# Patient Record
Sex: FEMALE | Race: WHITE | HISPANIC OR LATINO | URBAN - METROPOLITAN AREA
[De-identification: names, ages, dates, MRNs, and addresses within clinical notes are randomized per-mention and may not be internally consistent; named-entity substitution may affect disease eponyms.]

---

## 2024-08-15 ENCOUNTER — EMERGENCY (EMERGENCY)
Facility: HOSPITAL | Age: 49
LOS: 0 days | Discharge: HOME | End: 2024-08-15
Attending: EMERGENCY MEDICINE
Payer: MEDICAID

## 2024-08-15 VITALS
SYSTOLIC BLOOD PRESSURE: 161 MMHG | DIASTOLIC BLOOD PRESSURE: 96 MMHG | RESPIRATION RATE: 18 BRPM | HEART RATE: 85 BPM | TEMPERATURE: 98 F | OXYGEN SATURATION: 98 %

## 2024-08-15 VITALS — HEART RATE: 84 BPM | SYSTOLIC BLOOD PRESSURE: 167 MMHG | DIASTOLIC BLOOD PRESSURE: 71 MMHG

## 2024-08-15 DIAGNOSIS — E11.9 TYPE 2 DIABETES MELLITUS WITHOUT COMPLICATIONS: ICD-10-CM

## 2024-08-15 DIAGNOSIS — E78.5 HYPERLIPIDEMIA, UNSPECIFIED: ICD-10-CM

## 2024-08-15 DIAGNOSIS — H55.00 UNSPECIFIED NYSTAGMUS: ICD-10-CM

## 2024-08-15 DIAGNOSIS — R51.9 HEADACHE, UNSPECIFIED: ICD-10-CM

## 2024-08-15 DIAGNOSIS — R42 DIZZINESS AND GIDDINESS: ICD-10-CM

## 2024-08-15 LAB
ALBUMIN SERPL ELPH-MCNC: 5 G/DL — SIGNIFICANT CHANGE UP (ref 3.5–5.2)
ALP SERPL-CCNC: 42 U/L — SIGNIFICANT CHANGE UP (ref 30–115)
ALT FLD-CCNC: 21 U/L — SIGNIFICANT CHANGE UP (ref 0–41)
ANION GAP SERPL CALC-SCNC: 13 MMOL/L — SIGNIFICANT CHANGE UP (ref 7–14)
AST SERPL-CCNC: 20 U/L — SIGNIFICANT CHANGE UP (ref 0–41)
BASOPHILS # BLD AUTO: 0.03 K/UL — SIGNIFICANT CHANGE UP (ref 0–0.2)
BASOPHILS NFR BLD AUTO: 0.6 % — SIGNIFICANT CHANGE UP (ref 0–1)
BILIRUB SERPL-MCNC: 0.3 MG/DL — SIGNIFICANT CHANGE UP (ref 0.2–1.2)
BUN SERPL-MCNC: 20 MG/DL — SIGNIFICANT CHANGE UP (ref 10–20)
CALCIUM SERPL-MCNC: 9.9 MG/DL — SIGNIFICANT CHANGE UP (ref 8.4–10.5)
CHLORIDE SERPL-SCNC: 101 MMOL/L — SIGNIFICANT CHANGE UP (ref 98–110)
CO2 SERPL-SCNC: 24 MMOL/L — SIGNIFICANT CHANGE UP (ref 17–32)
CREAT SERPL-MCNC: 1 MG/DL — SIGNIFICANT CHANGE UP (ref 0.7–1.5)
EGFR: 69 ML/MIN/1.73M2 — SIGNIFICANT CHANGE UP
EOSINOPHIL # BLD AUTO: 0.12 K/UL — SIGNIFICANT CHANGE UP (ref 0–0.7)
EOSINOPHIL NFR BLD AUTO: 2.3 % — SIGNIFICANT CHANGE UP (ref 0–8)
GLUCOSE SERPL-MCNC: 125 MG/DL — HIGH (ref 70–99)
HCG SERPL QL: NEGATIVE — SIGNIFICANT CHANGE UP
HCT VFR BLD CALC: 39.3 % — SIGNIFICANT CHANGE UP (ref 37–47)
HGB BLD-MCNC: 13.4 G/DL — SIGNIFICANT CHANGE UP (ref 12–16)
IMM GRANULOCYTES NFR BLD AUTO: 0.6 % — HIGH (ref 0.1–0.3)
LYMPHOCYTES # BLD AUTO: 1.81 K/UL — SIGNIFICANT CHANGE UP (ref 1.2–3.4)
LYMPHOCYTES # BLD AUTO: 34.4 % — SIGNIFICANT CHANGE UP (ref 20.5–51.1)
MCHC RBC-ENTMCNC: 28.8 PG — SIGNIFICANT CHANGE UP (ref 27–31)
MCHC RBC-ENTMCNC: 34.1 G/DL — SIGNIFICANT CHANGE UP (ref 32–37)
MCV RBC AUTO: 84.5 FL — SIGNIFICANT CHANGE UP (ref 81–99)
MONOCYTES # BLD AUTO: 0.44 K/UL — SIGNIFICANT CHANGE UP (ref 0.1–0.6)
MONOCYTES NFR BLD AUTO: 8.4 % — SIGNIFICANT CHANGE UP (ref 1.7–9.3)
NEUTROPHILS # BLD AUTO: 2.83 K/UL — SIGNIFICANT CHANGE UP (ref 1.4–6.5)
NEUTROPHILS NFR BLD AUTO: 53.7 % — SIGNIFICANT CHANGE UP (ref 42.2–75.2)
NRBC # BLD: 0 /100 WBCS — SIGNIFICANT CHANGE UP (ref 0–0)
PLATELET # BLD AUTO: 262 K/UL — SIGNIFICANT CHANGE UP (ref 130–400)
PMV BLD: 10.4 FL — SIGNIFICANT CHANGE UP (ref 7.4–10.4)
POTASSIUM SERPL-MCNC: 3.8 MMOL/L — SIGNIFICANT CHANGE UP (ref 3.5–5)
POTASSIUM SERPL-SCNC: 3.8 MMOL/L — SIGNIFICANT CHANGE UP (ref 3.5–5)
PROT SERPL-MCNC: 7.8 G/DL — SIGNIFICANT CHANGE UP (ref 6–8)
RBC # BLD: 4.65 M/UL — SIGNIFICANT CHANGE UP (ref 4.2–5.4)
RBC # FLD: 13.6 % — SIGNIFICANT CHANGE UP (ref 11.5–14.5)
SODIUM SERPL-SCNC: 138 MMOL/L — SIGNIFICANT CHANGE UP (ref 135–146)
WBC # BLD: 5.26 K/UL — SIGNIFICANT CHANGE UP (ref 4.8–10.8)
WBC # FLD AUTO: 5.26 K/UL — SIGNIFICANT CHANGE UP (ref 4.8–10.8)

## 2024-08-15 PROCEDURE — 70496 CT ANGIOGRAPHY HEAD: CPT | Mod: 26,MC

## 2024-08-15 PROCEDURE — 36415 COLL VENOUS BLD VENIPUNCTURE: CPT

## 2024-08-15 PROCEDURE — 70450 CT HEAD/BRAIN W/O DYE: CPT | Mod: MC

## 2024-08-15 PROCEDURE — 70498 CT ANGIOGRAPHY NECK: CPT | Mod: MC

## 2024-08-15 PROCEDURE — 84703 CHORIONIC GONADOTROPIN ASSAY: CPT

## 2024-08-15 PROCEDURE — 85025 COMPLETE CBC W/AUTO DIFF WBC: CPT

## 2024-08-15 PROCEDURE — 70498 CT ANGIOGRAPHY NECK: CPT | Mod: 26,MC

## 2024-08-15 PROCEDURE — 70450 CT HEAD/BRAIN W/O DYE: CPT | Mod: 26,59,MC

## 2024-08-15 PROCEDURE — 70496 CT ANGIOGRAPHY HEAD: CPT | Mod: MC

## 2024-08-15 PROCEDURE — 36000 PLACE NEEDLE IN VEIN: CPT | Mod: XU

## 2024-08-15 PROCEDURE — 99284 EMERGENCY DEPT VISIT MOD MDM: CPT | Mod: 25

## 2024-08-15 PROCEDURE — 99285 EMERGENCY DEPT VISIT HI MDM: CPT

## 2024-08-15 PROCEDURE — 80053 COMPREHEN METABOLIC PANEL: CPT

## 2024-08-15 RX ORDER — MECLIZINE 12.5 MG/1
1 TABLET ORAL
Qty: 15 | Refills: 0
Start: 2024-08-15 | End: 2024-08-19

## 2024-08-15 RX ORDER — MECLIZINE 12.5 MG/1
50 TABLET ORAL ONCE
Refills: 0 | Status: COMPLETED | OUTPATIENT
Start: 2024-08-15 | End: 2024-08-15

## 2024-08-15 RX ORDER — FENOFIBRATE 30 MG/1
0 CAPSULE ORAL
Refills: 0 | DISCHARGE

## 2024-08-15 RX ORDER — KETOROLAC TROMETHAMINE 10 MG
15 TABLET ORAL ONCE
Refills: 0 | Status: DISCONTINUED | OUTPATIENT
Start: 2024-08-15 | End: 2024-08-15

## 2024-08-15 RX ORDER — ATORVASTATIN CALCIUM 40 MG/1
0 TABLET, FILM COATED ORAL
Refills: 0 | DISCHARGE

## 2024-08-15 RX ORDER — DEXTROSE MONOHYDRATE, SODIUM CHLORIDE, SODIUM LACTATE, CALCIUM CHLORIDE, MAGNESIUM CHLORIDE 1.5; 538; 448; 18.4; 5.08 G/100ML; MG/100ML; MG/100ML; MG/100ML; MG/100ML
1000 SOLUTION INTRAPERITONEAL ONCE
Refills: 0 | Status: COMPLETED | OUTPATIENT
Start: 2024-08-15 | End: 2024-08-15

## 2024-08-15 RX ADMIN — DEXTROSE MONOHYDRATE, SODIUM CHLORIDE, SODIUM LACTATE, CALCIUM CHLORIDE, MAGNESIUM CHLORIDE 1000 MILLILITER(S): 1.5; 538; 448; 18.4; 5.08 SOLUTION INTRAPERITONEAL at 19:29

## 2024-08-15 RX ADMIN — MECLIZINE 50 MILLIGRAM(S): 12.5 TABLET ORAL at 18:18

## 2024-08-15 NOTE — ED PROVIDER NOTE - ATTENDING APP SHARED VISIT CONTRIBUTION OF CARE
I personally evaluated patient. I agree with the findings and plan with all documentation on chart except as documented  in my note.    49-year-old female past medical history of well-controlled diabetes, dyslipidemia presents to the emergency department for vertigo type symptoms over the past 2 days with mild frontal headache.  Patient reports with certain head movements she gets dizzy and feels off balance.  No fever, chills, recent illness.  Patient denies any ear pain, tinnitus.  Patient denies any numbness, weakness, tingling in extremities.  Patient speaking normally and denies any other neurological complaints.  Patient has no chest pain or shortness of breath.    On exam, vital signs are reviewed.  Patient is well-appearing.  NIH score is 0 and patient has mild horizontal nystagmus on exam.  Patient has normal ear exam bilaterally, normal heart lung exam, benign abdominal exam.  IV placed and labs sent and patient given meclizine and fluids and head CT and CTA of head and neck.  ED workup negative for any acute findings and labs are unremarkable.  Patient felt much improved with meclizine and is ambulatory at baseline and symptoms resolved.  I discussed patient admission for neuro eval and possible MRI first outpatient management.  Patient opting for outpatient management with close follow-up and strict return precautions.    I discussed with patient need for possible MRI for further work up for their symptoms and how this was not possible in the Emergency Department at this time.  Follow up being provided to have further evaluation for this test given.    Full DC instructions discussed and patient knows when to seek immediate medical attention.  Patient has proper follow up.  All results discussed and patient aware they may require further work up.  Proper follow up ensured. Limitations of ED work up discussed.  Medications administered and prescribed/OTC home meds discussed.  All questions and concerns from patient or family addressed. Understanding of instructions verbalized.

## 2024-08-15 NOTE — ED PROVIDER NOTE - NSFOLLOWUPINSTRUCTIONS_ED_ALL_ED_FT
Vertigo is the feeling that you or your surroundings are moving when there is no actual movement. It is often described as a feeling of spinning, whirling, falling, or tilting. Vertigo may make you vomit or feel nauseated. You may have trouble standing or walking and may lose your balance.    Vertigo is often related to an inner ear problem, but it can have other more serious causes. If vertigo continues, you may need more tests to find its cause.    Follow-up care is a key part of your treatment and safety. Be sure to make and go to all appointments, and call your doctor or nurse advice line ([phone] in most provinces and territories) if you are having problems. It's also a good idea to know your test results and keep a list of the medicines you take.    How can you care for yourself at home?  Do not lie flat on your back. Prop yourself up slightly. This may reduce the spinning feeling. Keep your eyes open.  Move slowly to decrease your chance of falling.  If your doctor recommends medicine, take it exactly as directed.  Do not drive while you are having vertigo.  Certain exercises, called Sanderson-Daroff exercises, can help decrease vertigo. To do Sanderson-Daroff exercises:    Sit on the edge of a bed or sofa and quickly lie down on the side that causes the worst vertigo. Lie on your side with your ear down.  Stay in this position for at least 30 seconds or until the vertigo goes away.  Sit up. If this causes vertigo, wait for it to stop.  Repeat the procedure on the other side.  Repeat this 10 times. Do these exercises 2 times a day until the vertigo is gone.  When should you call for help?  	  Call 911 anytime you think you may need emergency care. For example, call if:    You passed out (lost consciousness).  You have sudden dizziness that doesn't get better.  You have dizziness along with symptoms of a heart attack. These may include:  Chest pain or pressure, or a strange feeling in the chest.  Sweating.  Shortness of breath.  Nausea or vomiting.  Pain, pressure, or a strange feeling in the back, neck, jaw, or upper belly or in one or both shoulders or arms.  Light-headedness or sudden weakness.  A fast or irregular heartbeat.  You have symptoms of a stroke. These may include:  Sudden numbness, tingling, weakness, or loss of movement in your face, arm, or leg, especially on only one side of your body.  Sudden vision changes.  Sudden trouble speaking.  Sudden confusion or trouble understanding simple statements.  Sudden problems with walking or balance.  A sudden, severe headache that is different from past headaches.

## 2024-08-15 NOTE — ED PROVIDER NOTE - TEST CONSIDERED BUT NOT PERFORMED
MR brain - I discussed with patient need for possible MRI for further work up for their symptoms and how this was not possible in the Emergency Department at this time.  Follow up being provided to have further evaluation for this test given. Tests Considered But Not Performed

## 2024-08-15 NOTE — ED PROVIDER NOTE - PHYSICAL EXAMINATION
VITAL SIGNS: I have reviewed nursing notes and confirm.  CONSTITUTIONAL: Well-developed; well-nourished; in no acute distress.   SKIN: skin exam is warm and dry, no acute rash.    HEAD: Normocephalic; atraumatic.  EYES: PERRL, EOM intact; conjunctiva and sclera clear.  ENT: No nasal discharge; airway clear.  CARD: S1, S2 normal; no murmurs, gallops, or rubs. Regular rate and rhythm.   RESP: No wheezes, rales or rhonchi.  EXT: Normal ROM.  No clubbing, cyanosis or edema. .  NEURO: Alert, oriented, grossly unremarkable

## 2024-08-15 NOTE — ED PROVIDER NOTE - PROGRESS NOTE DETAILS
.Patient evaluated and feels much improved and is ambulating at baseline.  She is no longer dizzy.  CT scans and CTA of head and neck negative for any acute findings.  Shared decision making utilized and will discharge with ENT follow-up and patient aware she may require further workup for vertigo, including MRI of brain and ear canal.  Strict return precautions discussed and patient understands discharge instructions.

## 2024-08-15 NOTE — ED PROVIDER NOTE - DIFFERENTIAL DIAGNOSIS
Differential Diagnosis The differential diagnosis for patients clinical presentation includes but is not limited to:  vertigo, CVA, TIA, complicated migraine

## 2024-08-15 NOTE — ED PROVIDER NOTE - OBJECTIVE STATEMENT
Patient is a 49-year-old female history of diabetes, hyperlipidemia here for evaluation of positional dizziness over the past 2 days with associated frontal headache.  Patient denies vision changes, weakness, numbness, fever, chills, chest pain, shortness of breath.

## 2024-08-15 NOTE — ED PROVIDER NOTE - CLINICAL SUMMARY MEDICAL DECISION MAKING FREE TEXT BOX
49-year-old female past medical history of well-controlled diabetes, dyslipidemia presents to the emergency department for vertigo type symptoms over the past 2 days with mild frontal headache.  Patient reports with certain head movements she gets dizzy and feels off balance.  No fever, chills, recent illness.  Patient denies any ear pain, tinnitus.  Patient denies any numbness, weakness, tingling in extremities.  Patient speaking normally and denies any other neurological complaints.  Patient has no chest pain or shortness of breath.    On exam, vital signs are reviewed.  Patient is well-appearing.  NIH score is 0 and patient has mild horizontal nystagmus on exam.  Patient has normal ear exam bilaterally, normal heart lung exam, benign abdominal exam.  IV placed and labs sent and patient given meclizine and fluids and head CT and CTA of head and neck.  ED workup negative for any acute findings and labs are unremarkable.  Patient felt much improved with meclizine and is ambulatory at baseline and symptoms resolved.  I discussed patient admission for neuro eval and possible MRI first outpatient management.  Patient opting for outpatient management with close follow-up and strict return precautions.    I discussed with patient need for possible MRI for further work up for their symptoms and how this was not possible in the Emergency Department at this time.  Follow up being provided to have further evaluation for this test given.    Full DC instructions discussed and patient knows when to seek immediate medical attention.  Patient has proper follow up.  All results discussed and patient aware they may require further work up.  Proper follow up ensured. Limitations of ED work up discussed.  Medications administered and prescribed/OTC home meds discussed.  All questions and concerns from patient or family addressed. Understanding of instructions verbalized.

## 2024-08-15 NOTE — ED PROVIDER NOTE - PATIENT PORTAL LINK FT
You can access the FollowMyHealth Patient Portal offered by [facility] by registering at the following website: [email or web address]. By joining [facility]’s FollowMyHealth portal, you will also be able to view your health information using other applications (apps) compatible with our system.

## 2024-08-16 ENCOUNTER — NON-APPOINTMENT (OUTPATIENT)
Age: 49
End: 2024-08-16

## 2024-08-16 NOTE — CHART NOTE - NSCHARTNOTEFT_GEN_A_CORE
[facility] MRN [id number] emailed ENT 8/16- AC / Appointment made - JL     Specialty: ENT  Date: September 3, 2024 @ 1:45 PM  Location: [address] [place] [address] [place]

## 2024-08-30 PROBLEM — Z00.00 ENCOUNTER FOR PREVENTIVE HEALTH EXAMINATION: Status: ACTIVE | Noted: 2024-08-30

## 2024-09-03 ENCOUNTER — APPOINTMENT (OUTPATIENT)
Dept: OTOLARYNGOLOGY | Facility: CLINIC | Age: 49
End: 2024-09-03
Payer: MEDICAID

## 2024-09-03 VITALS — BODY MASS INDEX: 30.65 KG/M2 | HEIGHT: 63 IN | WEIGHT: 173 LBS

## 2024-09-03 DIAGNOSIS — R42 DIZZINESS AND GIDDINESS: ICD-10-CM

## 2024-09-03 PROBLEM — E11.9 TYPE 2 DIABETES MELLITUS WITHOUT COMPLICATIONS: Chronic | Status: ACTIVE | Noted: 2024-08-15

## 2024-09-03 PROBLEM — E78.00 PURE HYPERCHOLESTEROLEMIA, UNSPECIFIED: Chronic | Status: ACTIVE | Noted: 2024-08-15

## 2024-09-03 PROCEDURE — 99204 OFFICE O/P NEW MOD 45 MIN: CPT

## 2024-09-03 PROCEDURE — 92557 COMPREHENSIVE HEARING TEST: CPT

## 2024-09-03 PROCEDURE — 92550 TYMPANOMETRY & REFLEX THRESH: CPT | Mod: 52

## 2024-09-03 NOTE — ASSESSMENT
[FreeTextEntry1] : I reviewed, interpreted, and discussed the Audiogram done today. WNL.  I personally reviewed, interpreted and discussed patient's CT images from 8/15/24 showing no stroke.   Probable labyrinthitis due to CCOVID 3 weeks prior. Now back to normal. Normal neuro-otologic exam.  RTC as needed.

## 2024-09-03 NOTE — HISTORY OF PRESENT ILLNESS
[Dizziness] : dizziness [Diabetes] : diabetes [de-identified] : Patient presents today c/o vertigo. States on August 15 she was coming out of car and started feeling unbalanced and slightly dizzy. Felt some pressure around the head. She went to ED and they did some imaging which was negative. She is feeling better now. Felt a swaying sensation when she would close her eyes. Felt very unbalanced. No nausea or vomiting. Episodes lasted for 10 minutes. Was given Meclizine in the ER with improvement. Not sure want triggered the episode.   [Ear Fullness] : no ear fullness [Tinnitus] : no tinnitus [Anxiety] : no anxiety [Headache] : no headache [Hearing Loss] : no hearing loss [Lightheadedness] : no lightheadedness [Neurologic Symptoms] : no associated neurologic symptoms [Orthostatic Hypotension] : no orthostatic hypotension [Otalgia] : no otalgia [Otorrhea] : no otorrhea [Vertigo] : no vertigo [Visual Changes] : no visual changes [Recurrent Otitis Media] : no recurrent otitis media [Meningitis] : no meningitis [Glomus Tumor] : no glomus tumor [Otitis Media with Effusion] : no otitis media with effusion [Stroke] : no stroke [Acoustic Neuroma] : no acoustic neuroma [Presbycusis] : no presbycusis [Prior Ear Surgery] : no prior ear surgery [Facial Nerve Paralysis] : no facial nerve paralysis [Congenital Ear Malformation] : no congenital ear malformation [Allergic Rhinitis] : no allergic rhinitis [Major Depression] : no major depression [Eustachian Tube Dysfunction] : no eustachian tube dysfunction [Cholesteatoma] : no cholesteatoma [Multiple Sclerosis] : no multiple sclerosis [Perilymphatic Fistula] : no perilymphatic fistula [Hypertension] : no hypertension [Hypotension] : no hypotension [Ototoxic Med Exposure] : no ototoxic medication exposure [History of TM Perforation] : no history of a tympanic membrane perforation [Loud Noise Exposure] : no history of loud noise exposure [Hx of Radiation Therapy] : no history of radiation therapy [Birth Prematurity] : no birth prematurity [Smoking] : no smoking [Alcohol] : no consumption of alcohol [Narcotic/Benzodiazepine] : no use of narcotic/benzodiazepine [Early Onset Hearing Loss] : no early onset hearing loss [Otosclerosis] : no otosclerosis [Meniere Disease] : no meniere disease [Cardiac Disease] : no cardiac disease [Autoimmune Disease] : no autoimmune disease

## 2024-09-03 NOTE — PHYSICAL EXAM
[Normal] : mucosa is normal [Midline] : trachea located in midline position [] : Hamilton-Hallpike test is negative

## 2024-09-14 ENCOUNTER — EMERGENCY (EMERGENCY)
Facility: HOSPITAL | Age: 49
LOS: 0 days | Discharge: HOME | End: 2024-09-14
Attending: EMERGENCY MEDICINE
Payer: MEDICAID

## 2024-09-14 VITALS
DIASTOLIC BLOOD PRESSURE: 99 MMHG | WEIGHT: 173.94 LBS | SYSTOLIC BLOOD PRESSURE: 173 MMHG | HEART RATE: 78 BPM | OXYGEN SATURATION: 98 % | TEMPERATURE: 99 F | RESPIRATION RATE: 20 BRPM | HEIGHT: 64 IN

## 2024-09-14 DIAGNOSIS — E78.5 HYPERLIPIDEMIA, UNSPECIFIED: ICD-10-CM

## 2024-09-14 DIAGNOSIS — R51.9 HEADACHE, UNSPECIFIED: ICD-10-CM

## 2024-09-14 DIAGNOSIS — E11.9 TYPE 2 DIABETES MELLITUS WITHOUT COMPLICATIONS: ICD-10-CM

## 2024-09-14 DIAGNOSIS — G44.89 OTHER HEADACHE SYNDROME: ICD-10-CM

## 2024-09-14 PROCEDURE — 99284 EMERGENCY DEPT VISIT MOD MDM: CPT

## 2024-09-14 PROCEDURE — 70450 CT HEAD/BRAIN W/O DYE: CPT | Mod: 26,MC

## 2024-09-14 PROCEDURE — 99284 EMERGENCY DEPT VISIT MOD MDM: CPT | Mod: 25

## 2024-09-14 PROCEDURE — 70450 CT HEAD/BRAIN W/O DYE: CPT | Mod: MC

## 2025-01-08 ENCOUNTER — APPOINTMENT (OUTPATIENT)
Dept: PAIN MANAGEMENT | Facility: CLINIC | Age: 50
End: 2025-01-08
Payer: MEDICAID

## 2025-01-08 DIAGNOSIS — M54.16 RADICULOPATHY, LUMBAR REGION: ICD-10-CM

## 2025-01-08 DIAGNOSIS — M79.18 MYALGIA, OTHER SITE: ICD-10-CM

## 2025-01-08 PROCEDURE — 20552 NJX 1/MLT TRIGGER POINT 1/2: CPT

## 2025-01-08 PROCEDURE — 99204 OFFICE O/P NEW MOD 45 MIN: CPT | Mod: 25

## 2025-01-09 RX ORDER — GABAPENTIN 300 MG/1
300 CAPSULE ORAL 3 TIMES DAILY
Qty: 90 | Refills: 1 | Status: ACTIVE | COMMUNITY
Start: 2025-01-09 | End: 1900-01-01

## 2025-01-09 RX ORDER — NAPROXEN 500 MG/1
500 TABLET ORAL
Qty: 60 | Refills: 1 | Status: ACTIVE | COMMUNITY
Start: 2025-01-09 | End: 1900-01-01

## 2025-01-22 ENCOUNTER — APPOINTMENT (OUTPATIENT)
Dept: PAIN MANAGEMENT | Facility: CLINIC | Age: 50
End: 2025-01-22
Payer: MEDICAID

## 2025-01-22 DIAGNOSIS — M54.16 RADICULOPATHY, LUMBAR REGION: ICD-10-CM

## 2025-01-22 DIAGNOSIS — M79.18 MYALGIA, OTHER SITE: ICD-10-CM

## 2025-01-22 PROCEDURE — 99214 OFFICE O/P EST MOD 30 MIN: CPT

## 2025-01-23 ENCOUNTER — OUTPATIENT (OUTPATIENT)
Dept: OUTPATIENT SERVICES | Facility: HOSPITAL | Age: 50
LOS: 1 days | End: 2025-01-23

## 2025-01-23 DIAGNOSIS — K02.9 DENTAL CARIES, UNSPECIFIED: ICD-10-CM

## 2025-01-23 PROCEDURE — D0140: CPT

## 2025-01-23 PROCEDURE — D7140: CPT

## 2025-01-23 PROCEDURE — D0220: CPT

## 2025-01-27 ENCOUNTER — APPOINTMENT (OUTPATIENT)
Dept: MRI IMAGING | Facility: CLINIC | Age: 50
End: 2025-01-27
Payer: MEDICAID

## 2025-01-27 DIAGNOSIS — K02.9 DENTAL CARIES, UNSPECIFIED: ICD-10-CM

## 2025-01-27 PROCEDURE — 72148 MRI LUMBAR SPINE W/O DYE: CPT

## 2025-02-05 ENCOUNTER — APPOINTMENT (OUTPATIENT)
Dept: PAIN MANAGEMENT | Facility: CLINIC | Age: 50
End: 2025-02-05
Payer: MEDICAID

## 2025-02-05 DIAGNOSIS — M54.16 RADICULOPATHY, LUMBAR REGION: ICD-10-CM

## 2025-02-05 PROCEDURE — 99214 OFFICE O/P EST MOD 30 MIN: CPT

## 2025-05-07 ENCOUNTER — APPOINTMENT (OUTPATIENT)
Dept: PAIN MANAGEMENT | Facility: CLINIC | Age: 50
End: 2025-05-07
Payer: MEDICAID

## 2025-05-07 DIAGNOSIS — M51.26 OTHER INTERVERTEBRAL DISC DISPLACEMENT, LUMBAR REGION: ICD-10-CM

## 2025-05-07 DIAGNOSIS — M54.16 RADICULOPATHY, LUMBAR REGION: ICD-10-CM

## 2025-05-07 DIAGNOSIS — M79.18 MYALGIA, OTHER SITE: ICD-10-CM

## 2025-05-07 PROCEDURE — 99214 OFFICE O/P EST MOD 30 MIN: CPT

## 2025-05-09 PROBLEM — M51.26 HERNIATION OF LUMBAR INTERVERTEBRAL DISC: Status: ACTIVE | Noted: 2025-05-09
